# Patient Record
(demographics unavailable — no encounter records)

---

## 2024-10-21 NOTE — PHYSICAL EXAM
[Well Groomed] : well groomed [General Appearance - In No Acute Distress] : no acute distress [Normal Conjunctiva] : the conjunctiva exhibited no abnormalities [Eyelids - No Xanthelasma] : the eyelids demonstrated no xanthelasmas [Normal Oral Mucosa] : normal oral mucosa [No Oral Pallor] : no oral pallor [No Oral Cyanosis] : no oral cyanosis [Normal Jugular Venous A Waves Present] : normal jugular venous A waves present [Normal Jugular Venous V Waves Present] : normal jugular venous V waves present [No Jugular Venous Laurent A Waves] : no jugular venous laurent A waves [Respiration, Rhythm And Depth] : normal respiratory rhythm and effort [Auscultation Breath Sounds / Voice Sounds] : lungs were clear to auscultation bilaterally [Exaggerated Use Of Accessory Muscles For Inspiration] : no accessory muscle use [Abdomen Soft] : soft [Abdomen Tenderness] : non-tender [Abdomen Mass (___ Cm)] : no abdominal mass palpated [Abnormal Walk] : normal gait [Gait - Sufficient For Exercise Testing] : the gait was sufficient for exercise testing [Nail Clubbing] : no clubbing of the fingernails [Cyanosis, Localized] : no localized cyanosis [Petechial Hemorrhages (___cm)] : no petechial hemorrhages [Skin Color & Pigmentation] : normal skin color and pigmentation [] : no rash [No Venous Stasis] : no venous stasis [Skin Lesions] : no skin lesions [No Skin Ulcers] : no skin ulcer [No Xanthoma] : no  xanthoma was observed [Oriented To Time, Place, And Person] : oriented to person, place, and time [Affect] : the affect was normal [Mood] : the mood was normal [No Anxiety] : not feeling anxious [Normal Rate] : normal [Rhythm Regular] : regular [Normal S1] : normal S1 [Normal S2] : normal S2 [No Gallop] : no gallop heard [No Murmur] : no murmurs heard [2+] : left 2+ [No Pitting Edema] : no pitting edema present [Right Carotid Bruit] : no bruit heard over the right carotid [Left Carotid Bruit] : no bruit heard over the left carotid [Bruit] : no bruit heard

## 2024-10-21 NOTE — CARDIOLOGY SUMMARY
[de-identified] : Sinus Rhythm [de-identified] : 9/20/21 12 MET excellent normal spect [de-identified] : 9/2021 normal LV function

## 2024-10-21 NOTE — DISCUSSION/SUMMARY
[EKG obtained to assist in diagnosis and management of assessed problem(s)] : EKG obtained to assist in diagnosis and management of assessed problem(s) [FreeTextEntry1] : 59 year man with a history as listed presents for a followup cardiac evaluation.   Segundo is doing well. His EKG did not reveal any significant ischemic changes.  He denies any anginal symptoms. Clinically he is euvolemic on exam.   Again his HR is on the bradycardic side but improved after stopping the Bisoprolol.      He had a  of his LAD that was fixed with a Resolute stent because of his anginal symptoms. He will continue with ASA and Plavix possible indefinitely given the location of his PCI.   Previously, H/H was trending downward. but normalized based on most recent labs from July.  He denies any GIB. He had a reported normal colonoscopy within the last 5 years.   his blood pressure is better controlled. He will continue Losartan 100mg Qday.     He will continue with Crestor 40mg HS. His goal LDL is <70, most recent is 67. With change to Crestor his small particle LDL has improved.  He is taking the Vascepa, TG have improved, most recent was 88  Prior to his next office visit, For surveillance, I have advised he undergo nuclear stress test to define exercise tolerance, rule out exertional hypertensive responses, assess for exercise induced arrhythmias and rule out ischemia from obstructive CAD. He will also have a  2d echo to assess for any new structural heart disease, changes in valvular and ventricular function.  Exercise and diet counseling was performed in order to reduce her future cardiovascular risk.   He will followup with me in 6 months or sooner if necessary.

## 2024-10-21 NOTE — HISTORY OF PRESENT ILLNESS
[FreeTextEntry1] : 59 year old man with a history of hyperlipidemia, prediabetes, CAD s/p PCI of pLAD with 4.0 Resolute in 6/2018, fatty liver.  He went to have an urgent angiogram for UA and was found to have a subacute  of pLAD in 6/2018. It was decided to perform PCI with a Resolute stent.     Since his last visit he has been feeling well. He denies any further chest pain. He denies any MENDOZA. He   denies any   PND, orthopnea, lower extremity edema, near syncope, syncope, strokelike symptoms.  No reported melena, hematochezia or hematemesis.  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  He is compliant with his medications. He trying maintain a heart healthy diet.

## 2024-10-21 NOTE — CARDIOLOGY SUMMARY
[de-identified] : Sinus Rhythm [de-identified] : 9/20/21 12 MET excellent normal spect [de-identified] : 9/2021 normal LV function

## 2024-10-21 NOTE — END OF VISIT
[FreeTextEntry3] : I saw and evaluated the patient and discussed the care with the NP provider above on 10/18/2024 . I agree with the findings and plan as documented in the note above. Has justice. He will undergo a nuclear stress test to assess for underlying obstructive CAD.   He will get a 2d echo to assess for any  new structural heart disease, changes in valvular and ventricular function.

## 2024-10-21 NOTE — PHYSICAL EXAM
[Well Groomed] : well groomed [General Appearance - In No Acute Distress] : no acute distress [Normal Conjunctiva] : the conjunctiva exhibited no abnormalities [Eyelids - No Xanthelasma] : the eyelids demonstrated no xanthelasmas [Normal Oral Mucosa] : normal oral mucosa [No Oral Pallor] : no oral pallor [No Oral Cyanosis] : no oral cyanosis [Normal Jugular Venous A Waves Present] : normal jugular venous A waves present [Normal Jugular Venous V Waves Present] : normal jugular venous V waves present [No Jugular Venous Laurent A Waves] : no jugular venous laurent A waves [Respiration, Rhythm And Depth] : normal respiratory rhythm and effort [Auscultation Breath Sounds / Voice Sounds] : lungs were clear to auscultation bilaterally [Exaggerated Use Of Accessory Muscles For Inspiration] : no accessory muscle use [Abdomen Soft] : soft [Abdomen Tenderness] : non-tender [Abdomen Mass (___ Cm)] : no abdominal mass palpated [Abnormal Walk] : normal gait [Gait - Sufficient For Exercise Testing] : the gait was sufficient for exercise testing [Nail Clubbing] : no clubbing of the fingernails [Cyanosis, Localized] : no localized cyanosis [Petechial Hemorrhages (___cm)] : no petechial hemorrhages [Skin Color & Pigmentation] : normal skin color and pigmentation [] : no rash [No Venous Stasis] : no venous stasis [Skin Lesions] : no skin lesions [No Skin Ulcers] : no skin ulcer [No Xanthoma] : no  xanthoma was observed [Oriented To Time, Place, And Person] : oriented to person, place, and time [Affect] : the affect was normal [No Anxiety] : not feeling anxious [Mood] : the mood was normal [Normal Rate] : normal [Rhythm Regular] : regular [Normal S1] : normal S1 [Normal S2] : normal S2 [No Gallop] : no gallop heard [No Murmur] : no murmurs heard [2+] : left 2+ [No Pitting Edema] : no pitting edema present [Right Carotid Bruit] : no bruit heard over the right carotid [Left Carotid Bruit] : no bruit heard over the left carotid [Bruit] : no bruit heard

## 2024-10-21 NOTE — HISTORY OF PRESENT ILLNESS
[FreeTextEntry1] : 59 year old man with a history of hyperlipidemia, prediabetes, CAD s/p PCI of pLAD with 4.0 Resolute in 6/2018, fatty liver.  He went to have an urgent angiogram for UA and was found to have a subacute  of pLAD in 6/2018. It was decided to perform PCI with a Resolute stent.     Since his last visit he has been feeling well. He denies any further chest pain. He denies any MENDOZA. He   denies any   PND, orthopnea, lower extremity edema, near syncope, syncope, strokelike symptoms.  No reported melena, hematochezia or hematemesis.  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  He is compliant with his medications. He trying maintain a heart healthy diet.

## 2024-12-19 NOTE — HISTORY OF PRESENT ILLNESS
[FreeTextEntry1] : Follow my health: Active user.   12/19/2024: 59-year-old male presents for follow-up.  Patient mentions that urination is basically unchanged.   However, after drinking alcohol the following day has on-and-off urgency.   Patient did not repeat his PSA after 10/2024.   No other change.   PSA: 4.75 / 32% on 02/17/2024.  MRI prostate 03/23/2024:   Prostate volume: 74cc  No MRI-targetable lesions.   PIRADS 1.   59-year-old male presents for follow up.  Had MRI Prostate.   DANIELA: 2/15/24.   Seen on 8/31/22 for Elevated PSA.  Denied any recent unintentional weight loss, night sweats and new bone or back pain. Family history of Prostate cancer: Father. Prostate issues? Reported reasonable stream, urinates 4-5 x or so during the day and no nocturia.  Denied hesitancy, straining, intermittency, urgency, incontinence or sense of incomplete emptying. Denied dysuria, hematuria, lower abdominal or flank pain, fever, chills or rigors. Normal libido, erections and ejaculation.   Patient was seen by me at [facility] in 2015.

## 2024-12-19 NOTE — ASSESSMENT
[FreeTextEntry1] : 12/19/2024: Discussed treatment options.   Patient would like to observe for now.  Obtained PSA today.  Will inform results.  Will continue PSA monitoring.   Return to office in 6 months or sooner if there are any issues. Will do uroflow and PVR.

## 2024-12-19 NOTE — ADDENDUM
[FreeTextEntry1] :  Haleigh SUMMERS assisted in documentation on 12/19/2024  acting as a scribe for Dr. Lalit Seaman.

## 2024-12-19 NOTE — PHYSICAL EXAM
[Normal Appearance] : normal appearance [General Appearance - In No Acute Distress] : no acute distress [] : no respiratory distress [Oriented To Time, Place, And Person] : oriented to person, place, and time [Abdomen Soft] : soft [Abdomen Tenderness] : non-tender [FreeTextEntry1] : normal peripheral circulation

## 2025-07-23 NOTE — PHYSICAL EXAM
[Well Groomed] : well groomed [General Appearance - In No Acute Distress] : no acute distress [Normal Conjunctiva] : the conjunctiva exhibited no abnormalities [Eyelids - No Xanthelasma] : the eyelids demonstrated no xanthelasmas [Normal Oral Mucosa] : normal oral mucosa [No Oral Pallor] : no oral pallor [No Oral Cyanosis] : no oral cyanosis [Normal Jugular Venous A Waves Present] : normal jugular venous A waves present [Normal Jugular Venous V Waves Present] : normal jugular venous V waves present [No Jugular Venous Laurent A Waves] : no jugular venous laurent A waves [Respiration, Rhythm And Depth] : normal respiratory rhythm and effort [Exaggerated Use Of Accessory Muscles For Inspiration] : no accessory muscle use [Auscultation Breath Sounds / Voice Sounds] : lungs were clear to auscultation bilaterally [Abdomen Soft] : soft [Abdomen Tenderness] : non-tender [Abdomen Mass (___ Cm)] : no abdominal mass palpated [Abnormal Walk] : normal gait [Gait - Sufficient For Exercise Testing] : the gait was sufficient for exercise testing [Nail Clubbing] : no clubbing of the fingernails [Cyanosis, Localized] : no localized cyanosis [Petechial Hemorrhages (___cm)] : no petechial hemorrhages [Skin Color & Pigmentation] : normal skin color and pigmentation [] : no rash [No Venous Stasis] : no venous stasis [Skin Lesions] : no skin lesions [No Skin Ulcers] : no skin ulcer [No Xanthoma] : no  xanthoma was observed [Oriented To Time, Place, And Person] : oriented to person, place, and time [Affect] : the affect was normal [Mood] : the mood was normal [No Anxiety] : not feeling anxious [Normal Rate] : normal [Rhythm Regular] : regular [Normal S1] : normal S1 [Normal S2] : normal S2 [No Gallop] : no gallop heard [No Murmur] : no murmurs heard [Right Carotid Bruit] : no bruit heard over the right carotid [Left Carotid Bruit] : no bruit heard over the left carotid [2+] : left 2+ [Bruit] : no bruit heard [No Pitting Edema] : no pitting edema present

## 2025-07-23 NOTE — CARDIOLOGY SUMMARY
[de-identified] : Sinus Bradycardia  -Anterolateral ST-elevation [de-identified] : 9/20/21 12 MET excellent normal spect 12/2024 10MET medium-sized, moderate defect(s) in the proximal to mid inferior wall that is predominantly fixed, predominantly corrects with prone imaging suggestive of diaphragmatic attenuation artifact. [de-identified] : 9/2021 normal LV function  12/2024 normal LV function.

## 2025-07-23 NOTE — HISTORY OF PRESENT ILLNESS
[FreeTextEntry1] : 60 year old man with a history of hyperlipidemia, prediabetes, CAD s/p PCI of pLAD with 4.0 Resolute in 6/2018, fatty liver.  He went to have an urgent angiogram for UA and was found to have a subacute  of pLAD in 6/2018. It was decided to perform PCI with a Resolute stent.     Since his last visit he has been feeling well. He denies any further chest pain. He denies any MENDOZA. He   denies any   PND, orthopnea, lower extremity edema, near syncope, syncope, strokelike symptoms.  No reported melena, hematochezia or hematemesis.  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 He is compliant with his medications. He trying maintain a heart healthy diet.

## 2025-07-23 NOTE — DISCUSSION/SUMMARY
[FreeTextEntry1] : 59 year man with a history as listed presents for a followup cardiac evaluation.   Segundo is doing well. His EKG did not reveal any significant ischemic changes.  He denies any anginal symptoms. Clinically he is euvolemic on exam.   Again his HR is on the bradycardic side but improved after stopping the Bisoprolol.      He had a  of his LAD that was fixed with a Resolute stent because of his anginal symptoms. He will continue with ASA and Plavix possible indefinitely given the location of his PCI.   Previously, H/H was trending downward. but normalized based on most recent labs from July.  He denies any GIB. He had a reported normal colonoscopy within the last 5 years.   his blood pressure is better controlled. He will continue Losartan 100mg Qday.     He will continue with Crestor 40mg HS. His goal LDL is <70, most recent is 67. With change to Crestor his small particle LDL has improved.  He is taking the Vascepa, TG have improved.  Exercise and diet counseling was performed in order to reduce her future cardiovascular risk.   He will followup with me in 6 months or sooner if necessary.   [EKG obtained to assist in diagnosis and management of assessed problem(s)] : EKG obtained to assist in diagnosis and management of assessed problem(s)